# Patient Record
Sex: MALE | Race: BLACK OR AFRICAN AMERICAN
[De-identification: names, ages, dates, MRNs, and addresses within clinical notes are randomized per-mention and may not be internally consistent; named-entity substitution may affect disease eponyms.]

---

## 2019-09-06 ENCOUNTER — HOSPITAL ENCOUNTER (EMERGENCY)
Dept: HOSPITAL 41 - JD.ED | Age: 34
Discharge: HOME | End: 2019-09-06
Payer: COMMERCIAL
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
## 2019-09-06 DIAGNOSIS — E11.9: ICD-10-CM

## 2019-09-06 DIAGNOSIS — S80.812A: Primary | ICD-10-CM

## 2019-09-06 DIAGNOSIS — I10: ICD-10-CM

## 2019-09-06 DIAGNOSIS — Z79.899: ICD-10-CM

## 2019-09-06 DIAGNOSIS — Z23: ICD-10-CM
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
